# Patient Record
Sex: MALE | Race: WHITE | NOT HISPANIC OR LATINO | Employment: FULL TIME | URBAN - METROPOLITAN AREA
[De-identification: names, ages, dates, MRNs, and addresses within clinical notes are randomized per-mention and may not be internally consistent; named-entity substitution may affect disease eponyms.]

---

## 2018-05-07 ENCOUNTER — OFFICE VISIT (OUTPATIENT)
Dept: SURGERY | Facility: CLINIC | Age: 33
End: 2018-05-07

## 2018-05-07 VITALS — WEIGHT: 269 LBS | BODY MASS INDEX: 36.44 KG/M2 | HEIGHT: 72 IN | OXYGEN SATURATION: 97 % | HEART RATE: 76 BPM

## 2018-05-07 DIAGNOSIS — K62.5 RECTAL BLEEDING: Primary | ICD-10-CM

## 2018-05-07 PROCEDURE — 99214 OFFICE O/P EST MOD 30 MIN: CPT | Performed by: SURGERY

## 2018-05-07 RX ORDER — ACETAMINOPHEN 325 MG/1
650 TABLET ORAL EVERY 6 HOURS PRN
COMMUNITY

## 2018-05-07 NOTE — PROGRESS NOTES
SURGERY  Sebastien Dupree   1985 05/07/18    Chief Complaint:  Rectal bleeding    HPI    Patient is a nice 32 y.o. male who is here today with his wife, with complaints of rectal bleeding.  At first I was quite concerned that this might be secondary to a recurrence of his extremely large perianal circumferential condylomas, Vonnie Avalos, treated in 2013, but happily that's not the case.  He describes this as being dark blood, with clots of purple, then changing to read, with a pink total elbow that is occurred over the last 2 weeks.  He is also noticed a tearing sensation while having a bowel movement.  He didn't have a bowel movement the following day and thus said his symptoms improved, but when he had a bowel movement again the next day, his symptoms were reinitiated with a sense of tearing.  He denies any constipation, that might put him in greater risk for a fissure, but also denies belly pain or fever that might be associated with colitis area he has had hypertension in the range of 140/80, but this doesn't seem high enough to cause any kind of rectal bleeding.  To help with that he's reduced his sodium and has lost 18 pounds.    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  He has no family history of colon cancer.  His last colonoscopy was in May 2016, and he did have a tubular adenoma in the transverse colon at that time.    Past Medical History:   Diagnosis Date   • Colon polyp 05/04/2016    tubular adenoma in transverse colon   • Genital warts due to HPV (human papillomavirus)     Lili   • H/O Demetri's disease    • Hypertension      Past Surgical History:   Procedure Laterality Date   • COLONOSCOPY N/A 5/4/2016    Procedure: COLONOSCOPY INTO CECUM/TERMINAL ILEUM WITH POLYPECTOMY;  Surgeon: Carley Pinto MD;  Location: [facility] ENDOSCOPY;   Service:    • EXCISION LESION N/A 09/19/2013    1.6 cm right groin lesion, 1.2 cm right groin lesion, 8 mm left perianal exophytic skin lesion, 12:30 to 2:30 left perianal 8 x 3 cm exophytic skin lesion, right thigh abscess. Dr. Carley Pinto   • LAPAROSCOPIC CHOLECYSTECTOMY N/A 07/19/2010    Dr. Fabián Carty   • PENILE LESION EXCISION/BIOPSY N/A 02/20/2014    Excision of 3 penile lesions, 6, 5, and 4 mm, excision of 2 infrascrotal lesions, right 4 mm eft 6 mm, excision of 2.2 cm pubic lesion, excision of 8 mm abdominal lesions, excision of right anterior perianal mass 9 x 4 cm, closure with advancement flaps-Dr. Carley Pinto   • PERIANAL LESION/CYST EXCISION N/A 09/11/2014    multiple penile/perineal/perianal lesions excised, Dr. Carley Pinto   • RECTAL MASS EXCISION N/A 3/17/2016    Procedure:  EXCISION OF PERIANAL MASSES, RIGHT GROIN; PATH - CONDYLOMA; Surgeon: Carley Pinto MD;  Location: [facility] OR [facility] [place];  Service:    • WOUND DEBRIDEMENT N/A 06/29/2013    Excisional debridement of Fougnie gangrene, left perianal-Dr. Carley Pinto     Family History   Problem Relation Age of Onset   • Cancer Father    • Breast cancer Mother      Social History     Social History   • Marital status:      Spouse name: Krista   • Number of children: N/A   • Years of education: N/A     Occupational History   • Manager      [employer]     Social History Main Topics   • Smoking status: Current Every Day Smoker     Packs/day: 1.00     Years: 7.00   • Smokeless tobacco: Never Used   • Alcohol use Yes      Comment: 2 drinks per week   • Drug use: No   • Sexual activity: Defer     Other Topics Concern   • Not on file     Social History Narrative   • No narrative on file     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   Gastrointestinal: Positive for anal bleeding and rectal pain.   Neurological: Positive for headache.   All other systems reviewed and are negative.      Vitals:    05/07/18 1324   Pulse: 76   SpO2: 97%   Weight: 122 kg (269 lb)   Height: 182.9 cm (72\")       PHYSICAL EXAM:    Pulse 76   Ht 182.9 cm (72\")   Wt 122 kg (269 lb)   SpO2 97%   BMI 36.48 kg/m²   Body mass index is 36.48 kg/m².    Constitutional: well developed, well nourished, Appears stated age or younger  Eyes: sclera nonicteric, conjunctiva not injected or edematous  ENMT: Hearing intact, trachea midline, thyroid without masses  CVS: RRR, no murmur, peripheral edema not present  Respiratory: CTA, normal respiratory effort   Gastrointestinal: no hepatosplenomegaly, abdomen soft, nontender, anus with scarring circumferentially, but happily with no evidence of recurrence except perhaps a very small papule/skin tag in the posterior region that may be a recurrent condyloma.  It's amazing that he could have such a large area of involvement, that was successfully managed.  He has a little bit of protuberance around the external anal verge, that might be construed to be symbolic of some increased pressure that might be internal hemorrhoids, but nothing that clearly consistent with a fissure.  I did not carry out an anal exam  Genitourinary: inguinal hernia not present  Musculoskeletal: gait normal, muscle mass normal  Skin: warm and dry, no rashes visible  Neurological: awake and alert, seems to have reasonable capacity for understanding for medical decision making  Psychiatric: appears to have reasonable judgement, pleasant  Lymphatics: no cervical adenopathy     Radiographic Findings: None    Lab Findings: None    Pamphlet reviewed: None    IMPRESSION:  · Rectal bleeding, described as dark clotted blood, which we've discussed his routinely more consistent with " right sided bleeding in the colon area did he denies any symptoms whatsoever on the right side, or anything to put him at significant increased risk of having anything occur on the right side.  His tearing sensation makes it more consistent with something like an anal issue and he has nothing to suggest that he would have a colitis.  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  After noting that if he has an internal hemorrhoid at that time that we will band that.  · We will either excise or fulgurate the posterior skin tag in the context of his prior history.  · Discussed the need for him to have colonoscopies more frequently than the routine individual due to his prior histories of condylomas and increased risk of squamous cell cancer of the anus, which I think we probably should be maybe every 3 years.    Carley Pinto MD  05/07/18  5:40 PM

## 2018-05-14 ENCOUNTER — HOSPITAL ENCOUNTER (OUTPATIENT)
Facility: HOSPITAL | Age: 33
Setting detail: HOSPITAL OUTPATIENT SURGERY
Discharge: HOME | End: 2018-05-14
Attending: SURGERY | Admitting: SURGERY

## 2018-05-14 ENCOUNTER — ANESTHESIA (OUTPATIENT)
Dept: GASTROENTEROLOGY | Facility: HOSPITAL | Age: 33
End: 2018-05-14

## 2018-05-14 ENCOUNTER — ANESTHESIA EVENT (OUTPATIENT)
Dept: GASTROENTEROLOGY | Facility: HOSPITAL | Age: 33
End: 2018-05-14

## 2018-05-14 VITALS
HEART RATE: 65 BPM | HEIGHT: 72 IN | TEMPERATURE: 97.6 F | WEIGHT: 261.13 LBS | BODY MASS INDEX: 35.37 KG/M2 | RESPIRATION RATE: 14 BRPM | OXYGEN SATURATION: 99 % | SYSTOLIC BLOOD PRESSURE: 137 MMHG | DIASTOLIC BLOOD PRESSURE: 82 MMHG

## 2018-05-14 DIAGNOSIS — K62.5 RECTAL BLEEDING: ICD-10-CM

## 2018-05-14 PROCEDURE — 25010000002 GLUCAGON (RDNA) PER 1 MG: Performed by: SURGERY

## 2018-05-14 PROCEDURE — 45380 COLONOSCOPY AND BIOPSY: CPT | Performed by: SURGERY

## 2018-05-14 PROCEDURE — 88305 TISSUE EXAM BY PATHOLOGIST: CPT | Performed by: SURGERY

## 2018-05-14 PROCEDURE — 25010000002 PROPOFOL 10 MG/ML EMULSION: Performed by: ANESTHESIOLOGY

## 2018-05-14 RX ORDER — SODIUM CHLORIDE, SODIUM LACTATE, POTASSIUM CHLORIDE, CALCIUM CHLORIDE 600; 310; 30; 20 MG/100ML; MG/100ML; MG/100ML; MG/100ML
1000 INJECTION, SOLUTION INTRAVENOUS CONTINUOUS
Status: DISCONTINUED | OUTPATIENT
Start: 2018-05-14 | End: 2018-05-14 | Stop reason: HOSPADM

## 2018-05-14 RX ORDER — PROPOFOL 10 MG/ML
VIAL (ML) INTRAVENOUS CONTINUOUS PRN
Status: DISCONTINUED | OUTPATIENT
Start: 2018-05-14 | End: 2018-05-14 | Stop reason: SURG

## 2018-05-14 RX ORDER — PROPOFOL 10 MG/ML
VIAL (ML) INTRAVENOUS AS NEEDED
Status: DISCONTINUED | OUTPATIENT
Start: 2018-05-14 | End: 2018-05-14 | Stop reason: SURG

## 2018-05-14 RX ORDER — LIDOCAINE HYDROCHLORIDE 20 MG/ML
INJECTION, SOLUTION INFILTRATION; PERINEURAL AS NEEDED
Status: DISCONTINUED | OUTPATIENT
Start: 2018-05-14 | End: 2018-05-14 | Stop reason: SURG

## 2018-05-14 RX ADMIN — SODIUM CHLORIDE, POTASSIUM CHLORIDE, SODIUM LACTATE AND CALCIUM CHLORIDE 1000 ML: 600; 310; 30; 20 INJECTION, SOLUTION INTRAVENOUS at 07:41

## 2018-05-14 RX ADMIN — PROPOFOL 140 MCG/KG/MIN: 10 INJECTION, EMULSION INTRAVENOUS at 08:44

## 2018-05-14 RX ADMIN — PROPOFOL 150 MG: 10 INJECTION, EMULSION INTRAVENOUS at 08:44

## 2018-05-14 RX ADMIN — LIDOCAINE HYDROCHLORIDE 50 MG: 20 INJECTION, SOLUTION INFILTRATION; PERINEURAL at 08:44

## 2018-05-14 NOTE — ANESTHESIA PREPROCEDURE EVALUATION
Anesthesia Evaluation     Patient summary reviewed                Airway   Mallampati: III  TM distance: >3 FB  Neck ROM: full  No difficulty expected  Dental - normal exam     Pulmonary     breath sounds clear to auscultation  Cardiovascular   Exercise tolerance: good (4-7 METS)    Rhythm: regular  Rate: normal        Neuro/Psych  GI/Hepatic/Renal/Endo      Musculoskeletal     Abdominal    Substance History      OB/GYN          Other                        Anesthesia Plan    ASA 2     MAC     intravenous induction   Anesthetic plan and risks discussed with patient.

## 2018-05-14 NOTE — ANESTHESIA POSTPROCEDURE EVALUATION
Patient: Sebastien Dupree    Procedure Summary     Date:  05/14/18 Room / Location:   [facility]    Anesthesia Start:  0841 Anesthesia Stop:  0904    Procedure:  COLONOSCOPY TO CECUM AND TI with possible internal hemorrhoidal biopsy (N/A ) Diagnosis:       Rectal bleeding      (Rectal bleeding [K62.5])    Surgeon:  Carley Pinto MD Provider:  Reji Muñoz MD    Anesthesia Type:  MAC ASA Status:  2          Anesthesia Type: MAC  Last vitals  BP   137/82 (05/14/18 0934)   Temp   36.4 °C (97.6 °F) (05/14/18 0733)   Pulse   65 (05/14/18 0934)   Resp   14 (05/14/18 0934)     SpO2   99 % (05/14/18 0934)     Post Anesthesia Care and Evaluation    Patient location during evaluation: bedside  Patient participation: complete - patient participated  Level of consciousness: awake and alert  Pain score: 0  Pain management: adequate  Airway patency: patent  Anesthetic complications: No anesthetic complications  PONV Status: none  Cardiovascular status: acceptable  Respiratory status: acceptable  Hydration status: acceptable  Post Neuraxial Block status: Motor and sensory function returned to baseline

## 2018-05-14 NOTE — H&P (VIEW-ONLY)
SURGERY  Sebastien Dupree   1985 05/07/18    Chief Complaint:  Rectal bleeding    HPI    Patient is a nice 32 y.o. male who is here today with his wife, with complaints of rectal bleeding.  At first I was quite concerned that this might be secondary to a recurrence of his extremely large perianal circumferential condylomas, Vonnie Avalos, treated in 2013, but happily that's not the case.  He describes this as being dark blood, with clots of purple, then changing to read, with a pink total elbow that is occurred over the last 2 weeks.  He is also noticed a tearing sensation while having a bowel movement.  He didn't have a bowel movement the following day and thus said his symptoms improved, but when he had a bowel movement again the next day, his symptoms were reinitiated with a sense of tearing.  He denies any constipation, that might put him in greater risk for a fissure, but also denies belly pain or fever that might be associated with colitis area he has had hypertension in the range of 140/80, but this doesn't seem high enough to cause any kind of rectal bleeding.  To help with that he's reduced his sodium and has lost 18 pounds.    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  He has no family history of colon cancer.  His last colonoscopy was in May 2016, and he did have a tubular adenoma in the transverse colon at that time.    Past Medical History:   Diagnosis Date   • Colon polyp 05/04/2016    tubular adenoma in transverse colon   • Genital warts due to HPV (human papillomavirus)     Lili   • H/O Demetri's disease    • Hypertension      Past Surgical History:   Procedure Laterality Date   • COLONOSCOPY N/A 5/4/2016    Procedure: COLONOSCOPY INTO CECUM/TERMINAL ILEUM WITH POLYPECTOMY;  Surgeon: Carley Pinto MD;  Location: [facility] ENDOSCOPY;   Service:    • EXCISION LESION N/A 09/19/2013    1.6 cm right groin lesion, 1.2 cm right groin lesion, 8 mm left perianal exophytic skin lesion, 12:30 to 2:30 left perianal 8 x 3 cm exophytic skin lesion, right thigh abscess. Dr. Carley Pinto   • LAPAROSCOPIC CHOLECYSTECTOMY N/A 07/19/2010    Dr. Fabián Carty   • PENILE LESION EXCISION/BIOPSY N/A 02/20/2014    Excision of 3 penile lesions, 6, 5, and 4 mm, excision of 2 infrascrotal lesions, right 4 mm eft 6 mm, excision of 2.2 cm pubic lesion, excision of 8 mm abdominal lesions, excision of right anterior perianal mass 9 x 4 cm, closure with advancement flaps-Dr. Carley Pinto   • PERIANAL LESION/CYST EXCISION N/A 09/11/2014    multiple penile/perineal/perianal lesions excised, Dr. Carley Pinto   • RECTAL MASS EXCISION N/A 3/17/2016    Procedure:  EXCISION OF PERIANAL MASSES, RIGHT GROIN; PATH - CONDYLOMA; Surgeon: Carley Pinto MD;  [place] [facility] [place] [facility] [place]  Service:    • WOUND DEBRIDEMENT N/A 06/29/2013    Excisional debridement of Fougnie gangrene, left perianal-Dr. Carley Pinto     Family History   Problem Relation Age of Onset   • Cancer Father    • Breast cancer Mother      Social History     Social History   • Marital status:      Spouse name: Krista   • Number of children: N/A   • Years of education: N/A     Occupational History   • Manager      [employer]     Social History Main Topics   • Smoking status: Current Every Day Smoker     Packs/day: 1.00     Years: 7.00   • Smokeless tobacco: Never Used   • Alcohol use Yes      Comment: 2 drinks per week   • Drug use: No   • Sexual activity: Defer     Other Topics Concern   • Not on file     Social History Narrative   • No narrative on file     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   Gastrointestinal: Positive for anal bleeding and rectal pain.   Neurological: Positive for headache.   All other systems reviewed and are negative.      Vitals:    05/07/18 1324   Pulse: 76   SpO2: 97%   Weight: 122 kg (269 lb)   Height: 182.9 cm (72\")       PHYSICAL EXAM:    Pulse 76   Ht 182.9 cm (72\")   Wt 122 kg (269 lb)   SpO2 97%   BMI 36.48 kg/m²   Body mass index is 36.48 kg/m².    Constitutional: well developed, well nourished, Appears stated age or younger  Eyes: sclera nonicteric, conjunctiva not injected or edematous  ENMT: Hearing intact, trachea midline, thyroid without masses  CVS: RRR, no murmur, peripheral edema not present  Respiratory: CTA, normal respiratory effort   Gastrointestinal: no hepatosplenomegaly, abdomen soft, nontender, anus with scarring circumferentially, but happily with no evidence of recurrence except perhaps a very small papule/skin tag in the posterior region that may be a recurrent condyloma.  It's amazing that he could have such a large area of involvement, that was successfully managed.  He has a little bit of protuberance around the external anal verge, that might be construed to be symbolic of some increased pressure that might be internal hemorrhoids, but nothing that clearly consistent with a fissure.  I did not carry out an anal exam  Genitourinary: inguinal hernia not present  Musculoskeletal: gait normal, muscle mass normal  Skin: warm and dry, no rashes visible  Neurological: awake and alert, seems to have reasonable capacity for understanding for medical decision making  Psychiatric: appears to have reasonable judgement, pleasant  Lymphatics: no cervical adenopathy     Radiographic Findings: None    Lab Findings: None    Pamphlet reviewed: None    IMPRESSION:  · Rectal bleeding, described as dark clotted blood, which we've discussed his routinely more consistent with " right sided bleeding in the colon area did he denies any symptoms whatsoever on the right side, or anything to put him at significant increased risk of having anything occur on the right side.  His tearing sensation makes it more consistent with something like an anal issue and he has nothing to suggest that he would have a colitis.  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  After noting that if he has an internal hemorrhoid at that time that we will band that.  · We will either excise or fulgurate the posterior skin tag in the context of his prior history.  · Discussed the need for him to have colonoscopies more frequently than the routine individual due to his prior histories of condylomas and increased risk of squamous cell cancer of the anus, which I think we probably should be maybe every 3 years.    Carley Pinto MD  05/07/18  5:40 PM

## 2018-05-14 NOTE — OP NOTE
Colonoscopy Procedure Note  Sebastien Dupree  1985  Date of Procedure: 05/14/18    Pre-operative Diagnosis:  · Rectal bleeding  · Buschke Robbie, perianal condylomas, 2013, with small recurrent skin tag posteriorly      Post-operative Diagnosis:  · Posterior anal fissure  · Perianal skin tag  · Ascending colon polyp    Procedure: Colonoscopy with terminal ileoscopy, snare cautery polypectomy, excision of perianal skin tag     Findings/Treatments:   · Anal fissure, posterior with anal papilla, which were biopsied  · Ascending colon polyp, 6 mm, removed via snare cautery polypectomy  · Perianal skin tag, which was excised       Recommendations:   · C scope in 3 years based on his prior history of circumferential perianal condyloma, Vonnie Avalos.  · Call for pathology on anal papilla, which I anticipate will be benign, and the perianal skin tag, which may be condylomatous.  If condylomatous, probably exam in the office in 3 months  · Copy of photographs to be given from today's procedure prior to discharge.    Surgeon: Bliar    Anesthetic: MAC per Reji Muñoz MD    Indications:  As above    Scope Withdrawal Time:  9 minutes  50 seconds    Procedure Details     MAC anesthesia was induced.  The 180 Colonoscopy was inserted blindly into the rectum and advanced to the cecum, with relative ease,  without need for pressure, lift, or turning.  Cecum was identified by ileocecal valve and appendiceal orifice and photographed for documentation.  Terminal ileum was intubated and evaluated for last 15-20 cm and was normal.  I did this in particular due to the patient's history of purple blood from the rectum.    Prep quality was excellent.  A careful inspection was made as the colonoscope was withdrawn, including a retroflexed view of the rectum; there was no suggestion of presence of angiodysplasias, particularly looking in the cecal region, no colitis, but there was the single polyps which was removed  via snare cautery, with no diverticula.  Settings on the cautery were on 1 and 11.     At the beginning of the case, I had retroflexed in the rectum to see if there were large hemorrhoids could be banded, and small none.  At the end, I retroflexed again, and visualizing the anal papilla, decided to biopsy those with the cold biopsy forceps.    We pulled the scope out and spread the perianal tissue with a white, and noted the posterior anal fissure.  In addition the area of the perianal tag was examined, cleaned with alcohol, anesthetized with 1% lidocaine with epinephrine, and the tag excised with the knife.    Carley Pinto MD  05/14/18   9:07 AM

## 2018-05-15 LAB
CYTO UR: NORMAL
LAB AP CASE REPORT: NORMAL
Lab: NORMAL
PATH REPORT.FINAL DX SPEC: NORMAL
PATH REPORT.GROSS SPEC: NORMAL

## 2018-05-15 NOTE — PROGRESS NOTES
Rabia (endoscopy liaison),    Please call patient to inform them of these findings and recommendations and ensure that any pamphlets that were to be given to the patient at the hospital were received.     Please make sure a letter is sent to the patient, recall method entered into the computer and the HIM tab updated as far as need for endoscopy follow up.    Thanks  Dr Pinto    Date of Procedure: 05/14/18     Pre-operative Diagnosis:  · Rectal bleeding  · Renettachgentry Avalos, perianal condylomas, 2013, with small recurrent skin tag posteriorly       Post-operative Diagnosis:  · Posterior anal fissure  · Perianal skin tag  · Ascending colon polyp     Procedure: Colonoscopy with terminal ileoscopy, snare cautery polypectomy, excision of perianal skin tag      Findings/Treatments:   · Anal fissure, posterior with anal papilla, which were biopsied;  PATHOLOGY NOT CONDYLOMATOUS  · Ascending colon polyp, 6 mm, removed via snare cautery polypectomy;  ADENOMATOUS POLYP, WITH PLAN FOR REPEAT C SCOPE IN 3 YEARS  · Perianal skin tag, which was excised; CONDYLOMATOUS.  OFFICE VISIT IN 3 MONTHS.    Recommendations:   · C scope in 3 years based on his prior history of circumferential perianal condyloma, Melissagentry Robbie.  · Call for pathology on anal papilla, which I anticipate will be benign, and the perianal skin tag, which may be condylomatous.  If condylomatous, probably exam in the office in 3 months  · Copy of photographs to be given from today's procedure prior to discharge.

## 2018-05-17 ENCOUNTER — TELEPHONE (OUTPATIENT)
Dept: SURGERY | Facility: CLINIC | Age: 33
End: 2018-05-17

## 2018-05-17 NOTE — TELEPHONE ENCOUNTER
----- Message from Carley Pinto MD sent at 5/15/2018  2:11 PM EDT -----  Rabia (endoscopy liaison),    Please call patient to inform them of these findings and recommendations and ensure that any pamphlets that were to be given to the patient at the hospital were received.     Please make sure a letter is sent to the patient, recall method entered into the computer and the HIM tab updated as far as need for endoscopy follow up.    Thanks  Dr Pinto    Date of Procedure: 05/14/18     Pre-operative Diagnosis:  · Rectal bleeding  · Vonnie Avalos, perianal condylomas, 2013, with small recurrent skin tag posteriorly       Post-operative Diagnosis:  · Posterior anal fissure  · Perianal skin tag  · Ascending colon polyp     Procedure: Colonoscopy with terminal ileoscopy, snare cautery polypectomy, excision of perianal skin tag      Findings/Treatments:   · Anal fissure, posterior with anal papilla, which were biopsied;  PATHOLOGY NOT CONDYLOMATOUS  · Ascending colon polyp, 6 mm, removed via snare cautery polypectomy;  ADENOMATOUS POLYP, WITH PLAN FOR REPEAT C SCOPE IN 3 YEARS  · Perianal skin tag, which was excised; CONDYLOMATOUS.  OFFICE VISIT IN 3 MONTHS.                                        Recommendations:   · C scope in 3 years based on his prior history of circumferential perianal condyloma, Vonnie Avalos.  · Call for pathology on anal papilla, which I anticipate will be benign, and the perianal skin tag, which may be condylomatous.  If condylomatous, probably exam in the office in 3 months  · Copy of photographs to be given from today's procedure prior to discharge.

## 2021-07-27 ENCOUNTER — OFFICE VISIT (OUTPATIENT)
Dept: SURGERY | Facility: CLINIC | Age: 36
End: 2021-07-27

## 2021-07-27 VITALS — WEIGHT: 297.8 LBS | BODY MASS INDEX: 40.34 KG/M2 | HEIGHT: 72 IN

## 2021-07-27 DIAGNOSIS — R11.0 NAUSEA: ICD-10-CM

## 2021-07-27 DIAGNOSIS — K62.5 RECTAL BLEEDING: Primary | ICD-10-CM

## 2021-07-27 DIAGNOSIS — K21.9 GASTROESOPHAGEAL REFLUX DISEASE, UNSPECIFIED WHETHER ESOPHAGITIS PRESENT: ICD-10-CM

## 2021-07-27 DIAGNOSIS — A63.0: ICD-10-CM

## 2021-07-27 DIAGNOSIS — R10.11 RUQ PAIN: ICD-10-CM

## 2021-07-27 DIAGNOSIS — R19.5 MUCUS IN STOOL: ICD-10-CM

## 2021-07-27 DIAGNOSIS — R10.30 LOWER ABDOMINAL PAIN: ICD-10-CM

## 2021-07-27 PROCEDURE — 99204 OFFICE O/P NEW MOD 45 MIN: CPT | Performed by: PHYSICIAN ASSISTANT

## 2021-07-27 RX ORDER — HYDROCORTISONE ACETATE, PRAMOXINE HCL 2.5; 1 G/100G; G/100G
1 CREAM TOPICAL 3 TIMES DAILY PRN
COMMUNITY
Start: 2021-07-19

## 2021-07-27 RX ORDER — LOSARTAN POTASSIUM 25 MG/1
25 TABLET ORAL DAILY
COMMUNITY

## 2021-07-27 RX ORDER — ONDANSETRON 4 MG/1
1 TABLET, ORALLY DISINTEGRATING ORAL EVERY 6 HOURS PRN
COMMUNITY
Start: 2021-07-19

## 2021-07-27 RX ORDER — ESCITALOPRAM OXALATE 10 MG/1
1 TABLET ORAL DAILY
COMMUNITY
Start: 2021-07-14

## 2021-07-27 RX ORDER — PANTOPRAZOLE SODIUM 40 MG/1
1 TABLET, DELAYED RELEASE ORAL DAILY
COMMUNITY
Start: 2021-07-19

## 2021-07-27 NOTE — PROGRESS NOTES
Chief Complaint   Patient presents with   • Rectal Bleeding   • Abdominal Pain        Subjective   Sebastien Dupree is a 36 y.o. gentleman who was self-referred for consultation.  Last saw Dr. Pinto greater than 3 years ago when he underwent a colonoscopy in May 2018.  At that time he was experiencing rectal bleeding as well as having a personal history of colon polyps.  He was recommended to undergo a follow-up colonoscopy in 3 years time.  He presents today having 1 week of multiple episodes of rectal bleeding as well as an urgency with his bowel movements.  He does state that he occasionally has constipation but then also has bowel movements which appear to have the mucousy appearance.  Additionally he has lower abdominal pain which radiates from his right lower quadrant up to into his right upper quadrant.  He has also had issues with nausea as well as worsening reflux of recent.  As a result of his symptoms he did go to [facility] on 7/19/2021 at which time a CT scan of the abdomen and pelvis was performed which demonstrated no abnormalities.  At that time he was also placed on Zofran and Protonix and he states he has noticed an improvement in his right upper quadrant abdominal pain as well as nausea and reflux.    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  He states things are controlled now.  He previously had his gallbladder removed in 2010 by Dr. Macario Carty.    Past Medical History:   Diagnosis Date   • Acid reflux    • Anxiety    • Back problem    • Colon polyp 05/04/2016    tubular adenoma in transverse colon   • Genital warts due to HPV (human papillomavirus)     Lili   • H/O Demetri's disease    • Hypertension    • Rectal bleeding      Past Surgical History:   Procedure Laterality Date   •  COLONOSCOPY N/A 5/4/2016    Procedure: COLONOSCOPY INTO CECUM/TERMINAL ILEUM WITH POLYPECTOMY;  Surgeon: Carley Pinto MD;  Location: [facility] ENDOSCOPY;  Service:    • COLONOSCOPY N/A 5/14/2018    Procedure: COLONOSCOPY TO CECUM AND TI with possible internal hemorrhoidal biopsy;  Surgeon: Carley Pinto MD;  Location: [facility] ENDOSCOPY;  Service: Gastroenterology   • EXCISION LESION N/A 09/19/2013    1.6 cm right groin lesion, 1.2 cm right groin lesion, 8 mm left perianal exophytic skin lesion, 12:30 to 2:30 left perianal 8 x 3 cm exophytic skin lesion, right thigh abscess. Dr. Carley Pinto   • LAPAROSCOPIC CHOLECYSTECTOMY N/A 07/19/2010    Dr. Fabián Carty   • PENILE LESION EXCISION/BIOPSY N/A 02/20/2014    Excision of 3 penile lesions, 6, 5, and 4 mm, excision of 2 infrascrotal lesions, right 4 mm eft 6 mm, excision of 2.2 cm pubic lesion, excision of 8 mm abdominal lesions, excision of right anterior perianal mass 9 x 4 cm, closure with advancement flaps-Dr. Carley Pinto   • PERIANAL LESION/CYST EXCISION N/A 09/11/2014    multiple penile/perineal/perianal lesions excised, Dr. Carley Pinto   • RECTAL MASS EXCISION N/A 3/17/2016    Procedure:  EXCISION OF PERIANAL MASSES, RIGHT GROIN; PATH - CONDYLOMA; Surgeon: Carley Pinto MD;  [place] [facility] [place] [facility]  Service:    • WOUND DEBRIDEMENT N/A 06/29/2013    Excisional debridement of Fougnie gangrene, left perianal-Dr. Carley Pinto       Current Outpatient Medications:   •  escitalopram (LEXAPRO) 10 MG tablet, Take 1 tablet by mouth Daily., Disp: , Rfl:   •  losartan (COZAAR) 25 MG tablet, Take 25 mg by mouth Daily., Disp: , Rfl:   •  ondansetron ODT (ZOFRAN-ODT) 4 MG disintegrating tablet, Place 1 tablet under the tongue Every 6 (Six) Hours As Needed., Disp: , Rfl:   •  pantoprazole (PROTONIX) 40 MG EC tablet, Take 1 tablet by mouth Daily., Disp: , Rfl:   •  Pramoxine-HC (Hydrocortisone Ace-Pramoxine) 2.5-1 % cream, Apply 1 application topically to the  appropriate area as directed 3 (Three) Times a Day As Needed., Disp: , Rfl:   •  acetaminophen (TYLENOL) 325 MG tablet, Take 650 mg by mouth Every 6 (Six) Hours As Needed for Mild Pain ., Disp: , Rfl:   No Known Allergies  Family History   Problem Relation Age of Onset   • Prostate cancer Father    • Breast cancer Mother    • Malig Hyperthermia Neg Hx      Social History     Socioeconomic History   • Marital status:      Spouse name: Krista   • Number of children: Not on file   • Years of education: Not on file   • Highest education level: Not on file   Tobacco Use   • Smoking status: Former Smoker     Packs/day: 2.00     Years: 10.00     Pack years: 20.00     Quit date: 2019     Years since quittin.5   • Smokeless tobacco: Never Used   Vaping Use   • Vaping Use: Never used   Substance and Sexual Activity   • Alcohol use: Yes     Alcohol/week: 1.0 standard drinks     Types: 1 Standard drinks or equivalent per week     Comment: monthly   • Drug use: No   • Sexual activity: Defer     Colonoscopy: Colonoscopy  with a history of colon polyps, 3-year follow-up was recommended    Review of Systems   Constitutional: Positive for diaphoresis and fatigue. Negative for activity change, appetite change, chills, fever and unexpected weight change.   HENT: Negative.    Eyes: Negative.    Respiratory: Negative.  Negative for apnea, cough, choking, chest tightness, shortness of breath, wheezing and stridor.    Cardiovascular: Negative.  Negative for chest pain, palpitations and leg swelling.   Gastrointestinal: Positive for abdominal distention, abdominal pain, anal bleeding, diarrhea, nausea and rectal pain.   Endocrine: Negative.    Genitourinary: Negative.    Musculoskeletal: Negative.    Skin: Negative.    Allergic/Immunologic: Negative.    Neurological: Negative.    Hematological: Negative.    Psychiatric/Behavioral: The patient is nervous/anxious.    All other systems reviewed and are negative.          Objective   There were no vitals filed for this visit.  Physical Exam  Vitals reviewed.   Constitutional:       General: He is not in acute distress.     Appearance: Normal appearance. He is obese.   HENT:      Head: Normocephalic and atraumatic.   Eyes:      General: No scleral icterus.     Conjunctiva/sclera: Conjunctivae normal.      Pupils: Pupils are equal, round, and reactive to light.   Cardiovascular:      Rate and Rhythm: Normal rate and regular rhythm.      Heart sounds: Normal heart sounds. No murmur heard.   No friction rub. No gallop.    Pulmonary:      Effort: Pulmonary effort is normal. No respiratory distress.      Breath sounds: Normal breath sounds. No stridor. No wheezing, rhonchi or rales.   Chest:      Chest wall: No tenderness.   Abdominal:      General: Abdomen is flat. There is no distension.      Palpations: Abdomen is soft. There is no mass.      Tenderness: There is no abdominal tenderness. There is no guarding or rebound.      Hernia: No hernia is present.   Musculoskeletal:         General: No tenderness. Normal range of motion.      Cervical back: Normal range of motion. No tenderness.   Skin:     General: Skin is warm and dry.      Findings: No rash.   Neurological:      Mental Status: He is alert and oriented to person, place, and time.   Psychiatric:         Mood and Affect: Mood normal.         Behavior: Behavior normal.         Thought Content: Thought content normal.         Judgment: Judgment normal.              Assessment/Plan    Diagnosis Plan   1. Rectal bleeding  Case Request    Case Request   2. Mucus in stool  Case Request    Case Request   3. Buschke-Robbie giant condyloma  Case Request    Case Request   4. RUQ pain  Case Request    Case Request   5. Lower abdominal pain  Case Request    Case Request   6. Gastroesophageal reflux disease, unspecified whether esophagitis present  Case Request    Case Request   7. Nausea  Case Request    Case Request       Clinical  Summary:  36 y.o. gentleman who presents to the office today with 1 week of multiple episodes of rectal bleeding, urgency with his bowel movements, mucousy bowel movements, lower abdominal pain and right upper quadrant abdominal pain, worsening reflux and occasional nausea.    -Colonoscopy: I have recommended proceeding with a colonoscopy due to his changes in bowel habits to include the mucousy stools as well as the rectal bleeding.  Additionally he is due for colonoscopy due to his history of colon polyps.  I discussed the risks and rationale with him with recently but not limited to bleeding, infection, bowel perforation and the need for additional procedures.  Any additional follow-up will be discussed once the results been reviewed.    -EGD: I am recommending proceeding with an EGD due to his worsening GERD, nausea and right upper quadrant abdominal pain.  The symptoms were improved with the introduction of Protonix as well as taking Zofran.  I did discuss the risks and rationale with him as discussed above.    -Vonnie Avalos giant condyloma: He states this is controlled and uses pramoxine HC.    Any additional follow-up will be discussed with him once the results have been reviewed.  All questions were answered and he was willing to proceed with all recommendations.      Isaias Gonzales PA-C

## 2021-08-03 PROBLEM — R11.0 NAUSEA: Status: ACTIVE | Noted: 2021-08-03

## 2021-08-03 PROBLEM — R10.30 LOWER ABDOMINAL PAIN: Status: ACTIVE | Noted: 2021-08-03

## 2021-08-03 PROBLEM — A63.0: Status: ACTIVE | Noted: 2021-08-03

## 2021-08-03 PROBLEM — R19.5 MUCUS IN STOOL: Status: ACTIVE | Noted: 2021-08-03

## 2021-08-03 PROBLEM — R10.11 RUQ PAIN: Status: ACTIVE | Noted: 2021-08-03

## 2021-08-03 PROBLEM — K21.9 GASTROESOPHAGEAL REFLUX DISEASE: Status: ACTIVE | Noted: 2021-08-03

## 2021-08-17 ENCOUNTER — TRANSCRIBE ORDERS (OUTPATIENT)
Dept: SLEEP MEDICINE | Facility: HOSPITAL | Age: 36
End: 2021-08-17

## 2021-08-17 ENCOUNTER — TELEPHONE (OUTPATIENT)
Dept: SURGERY | Facility: CLINIC | Age: 36
End: 2021-08-17

## 2021-08-17 DIAGNOSIS — Z01.818 OTHER SPECIFIED PRE-OPERATIVE EXAMINATION: Primary | ICD-10-CM

## 2021-08-20 ENCOUNTER — LAB (OUTPATIENT)
Dept: LAB | Facility: HOSPITAL | Age: 36
End: 2021-08-20

## 2021-08-20 DIAGNOSIS — Z01.818 OTHER SPECIFIED PRE-OPERATIVE EXAMINATION: ICD-10-CM

## 2021-08-20 PROCEDURE — U0004 COV-19 TEST NON-CDC HGH THRU: HCPCS

## 2021-08-20 PROCEDURE — C9803 HOPD COVID-19 SPEC COLLECT: HCPCS

## 2021-08-21 LAB — SARS-COV-2 ORF1AB RESP QL NAA+PROBE: NOT DETECTED

## 2021-08-23 ENCOUNTER — ANESTHESIA (OUTPATIENT)
Dept: GASTROENTEROLOGY | Facility: HOSPITAL | Age: 36
End: 2021-08-23

## 2021-08-23 ENCOUNTER — ANESTHESIA EVENT (OUTPATIENT)
Dept: GASTROENTEROLOGY | Facility: HOSPITAL | Age: 36
End: 2021-08-23

## 2021-08-23 ENCOUNTER — HOSPITAL ENCOUNTER (OUTPATIENT)
Facility: HOSPITAL | Age: 36
Setting detail: HOSPITAL OUTPATIENT SURGERY
Discharge: HOME | End: 2021-08-23
Attending: SURGERY | Admitting: SURGERY

## 2021-08-23 VITALS
BODY MASS INDEX: 40.35 KG/M2 | RESPIRATION RATE: 18 BRPM | SYSTOLIC BLOOD PRESSURE: 123 MMHG | OXYGEN SATURATION: 96 % | HEART RATE: 58 BPM | HEIGHT: 72 IN | DIASTOLIC BLOOD PRESSURE: 60 MMHG | WEIGHT: 297.9 LBS | TEMPERATURE: 97.4 F

## 2021-08-23 DIAGNOSIS — R11.0 NAUSEA: ICD-10-CM

## 2021-08-23 DIAGNOSIS — K62.5 RECTAL BLEEDING: ICD-10-CM

## 2021-08-23 DIAGNOSIS — A63.0: ICD-10-CM

## 2021-08-23 DIAGNOSIS — K21.9 GASTROESOPHAGEAL REFLUX DISEASE, UNSPECIFIED WHETHER ESOPHAGITIS PRESENT: ICD-10-CM

## 2021-08-23 DIAGNOSIS — R10.11 RUQ PAIN: ICD-10-CM

## 2021-08-23 DIAGNOSIS — R19.5 MUCUS IN STOOL: ICD-10-CM

## 2021-08-23 DIAGNOSIS — R10.30 LOWER ABDOMINAL PAIN: ICD-10-CM

## 2021-08-23 PROCEDURE — 25010000002 GLUCAGON (RDNA) PER 1 MG: Performed by: SURGERY

## 2021-08-23 PROCEDURE — 45378 DIAGNOSTIC COLONOSCOPY: CPT | Performed by: SURGERY

## 2021-08-23 PROCEDURE — 25010000002 PROPOFOL 10 MG/ML EMULSION: Performed by: REGISTERED NURSE

## 2021-08-23 PROCEDURE — 88305 TISSUE EXAM BY PATHOLOGIST: CPT | Performed by: SURGERY

## 2021-08-23 PROCEDURE — 43239 EGD BIOPSY SINGLE/MULTIPLE: CPT | Performed by: SURGERY

## 2021-08-23 RX ORDER — LIDOCAINE HYDROCHLORIDE 20 MG/ML
INJECTION, SOLUTION INFILTRATION; PERINEURAL AS NEEDED
Status: DISCONTINUED | OUTPATIENT
Start: 2021-08-23 | End: 2021-08-23 | Stop reason: SURG

## 2021-08-23 RX ORDER — SODIUM CHLORIDE 0.9 % (FLUSH) 0.9 %
10 SYRINGE (ML) INJECTION AS NEEDED
Status: DISCONTINUED | OUTPATIENT
Start: 2021-08-23 | End: 2021-08-23 | Stop reason: HOSPADM

## 2021-08-23 RX ORDER — SODIUM CHLORIDE, SODIUM LACTATE, POTASSIUM CHLORIDE, CALCIUM CHLORIDE 600; 310; 30; 20 MG/100ML; MG/100ML; MG/100ML; MG/100ML
1000 INJECTION, SOLUTION INTRAVENOUS CONTINUOUS
Status: DISCONTINUED | OUTPATIENT
Start: 2021-08-23 | End: 2021-08-23 | Stop reason: HOSPADM

## 2021-08-23 RX ORDER — PROPOFOL 10 MG/ML
VIAL (ML) INTRAVENOUS AS NEEDED
Status: DISCONTINUED | OUTPATIENT
Start: 2021-08-23 | End: 2021-08-23 | Stop reason: SURG

## 2021-08-23 RX ORDER — LIDOCAINE HYDROCHLORIDE 10 MG/ML
0.5 INJECTION, SOLUTION INFILTRATION; PERINEURAL ONCE AS NEEDED
Status: DISCONTINUED | OUTPATIENT
Start: 2021-08-23 | End: 2021-08-23 | Stop reason: HOSPADM

## 2021-08-23 RX ORDER — GLYCOPYRROLATE 0.2 MG/ML
INJECTION INTRAMUSCULAR; INTRAVENOUS AS NEEDED
Status: DISCONTINUED | OUTPATIENT
Start: 2021-08-23 | End: 2021-08-23 | Stop reason: SURG

## 2021-08-23 RX ADMIN — GLYCOPYRROLATE 0.2 MG: 0.2 INJECTION INTRAMUSCULAR; INTRAVENOUS at 11:32

## 2021-08-23 RX ADMIN — LIDOCAINE HYDROCHLORIDE 60 MG: 20 INJECTION, SOLUTION INFILTRATION; PERINEURAL at 11:32

## 2021-08-23 RX ADMIN — PROPOFOL 180 MCG/KG/MIN: 10 INJECTION, EMULSION INTRAVENOUS at 11:32

## 2021-08-23 RX ADMIN — SODIUM CHLORIDE, POTASSIUM CHLORIDE, SODIUM LACTATE AND CALCIUM CHLORIDE 1000 ML: 600; 310; 30; 20 INJECTION, SOLUTION INTRAVENOUS at 10:51

## 2021-08-23 RX ADMIN — PROPOFOL 100 MG: 10 INJECTION, EMULSION INTRAVENOUS at 11:32

## 2021-08-23 NOTE — OP NOTE
Colonoscopy/EGD Procedure Note  Sebastien Dupree  1985  Date of Procedure: 08/23/21    Pre-operative Diagnosis:    · Rectal bleeding, resolved.  · Reflux, improved on protonix.  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    Surgeon: Blair    Anesthetic: MAC per Magdy Willoughby MD    Indications:  As above    Scope Withdrawal Time:  6 minutes  6 seconds    Procedure Details     MAC anesthesia was induced.  The bite block was placed at this time, while the patient was still cooperative, prior to complete relaxation, for use during the EGD. The 180 Colonoscopy was inserted blindly into the rectum and advanced to the cecum, with relative ease without need for turning or pressure.    Cecum was identified by ileocecal valve and appendiceal orifice and photographed for documentation.  Terminal ileum was intubated and was normal.      Prep quality was excellent.  A careful inspection was made as the colonoscope was withdrawn, including a retroflexed view of the rectum; there was no suggestion of presence of angiodysplasias, colitis, polyps or diverticula, with no interventions as listed.      Attention was then turned to the EGD.  Of course, new gowns, new gloves, and new instruments were used.  Gastroscope was inserted thru the bite block and advanced under direct vision to second portion of the duodenum.  A careful inspection was made as the gastroscope was withdrawn, including a retroflexed view of the proximal stomach; findings and interventions are described above.   Biopsies were  done with the cold biopsy forceps.      Carley Pinto MD  08/23/21  12:02 EDT                     .

## 2021-08-23 NOTE — ANESTHESIA POSTPROCEDURE EVALUATION
"Patient: Sebastien Dupree    Procedure Summary     Date: 08/23/21 Room / Location:  [facility]    Anesthesia Start: 1129 Anesthesia Stop: 1156    Procedures:       COLONOSCOPY TO cecum and terminal ileum (N/A )      ESOPHAGOGASTRODUODENOSCOPY with bx (N/A Esophagus) Diagnosis:       Rectal bleeding      Mucus in stool      Buschke-Robbie giant condyloma      RUQ pain      Lower abdominal pain      Gastroesophageal reflux disease, unspecified whether esophagitis present      Nausea      (Rectal bleeding [K62.5])      (Mucus in stool [R19.5])      (Buschke-Robbie giant condyloma [A63.0])      (RUQ pain [R10.11])      (Lower abdominal pain [R10.30])      (Gastroesophageal reflux disease, unspecified whether esophagitis present [K21.9])      (Nausea [R11.0])    Surgeons: Carley Pinto MD Provider: Magdy Willoughby MD    Anesthesia Type: MAC ASA Status: 3          Anesthesia Type: MAC    Vitals  Vitals Value Taken Time   /60 08/23/21 1224   Temp     Pulse 61 08/23/21 1229   Resp 18 08/23/21 1224   SpO2 96 % 08/23/21 1229   Vitals shown include unvalidated device data.        Post Anesthesia Care and Evaluation    Patient location during evaluation: bedside  Patient participation: complete - patient participated  Level of consciousness: awake and alert  Pain management: adequate  Airway patency: patent  Anesthetic complications: No anesthetic complications    Cardiovascular status: acceptable  Respiratory status: acceptable  Hydration status: acceptable    Comments: /60   Pulse 58   Temp 36.3 °C (97.4 °F) (Oral)   Resp 18   Ht 182.9 cm (72\")   Wt 135 kg (297 lb 14.4 oz)   SpO2 96%   BMI 40.40 kg/m²       " normal...

## 2021-08-23 NOTE — H&P
Chief Complaint   Patient presents with   • Rectal Bleeding   • Abdominal Pain       Here for EGD and c scope for rectal bleeding and reflux (better with protonix).  He had some burning at the anus when this happened.  He had a polyp in 2016 that was adenomatous.      Subjective      Sebastien Dupree is a 36 y.o. gentleman who was self-referred for consultation.  Last saw Dr. Pinto greater than 3 years ago when he underwent a colonoscopy in May 2018.  At that time he was experiencing rectal bleeding as well as having a personal history of colon polyps.  He was recommended to undergo a follow-up colonoscopy in 3 years time.  He presents today having 1 week of multiple episodes of rectal bleeding as well as an urgency with his bowel movements.  He does state that he occasionally has constipation but then also has bowel movements which appear to have the mucousy appearance.  Additionally he has lower abdominal pain which radiates from his right lower quadrant up to into his right upper quadrant.  He has also had issues with nausea as well as worsening reflux of recent.  As a result of his symptoms he did go to [facility] on 7/19/2021 at which time a CT scan of the abdomen and pelvis was performed which demonstrated no abnormalities.  At that time he was also placed on Zofran and Protonix and he states he has noticed an improvement in his right upper quadrant abdominal pain as well as nausea and reflux.     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  Additionally he has Buschke Robbie and has undergone multiple excisions for these.  He states things are controlled now.  He previously had his gallbladder removed in 2010 by Dr. Macario Carty.     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 ENDOSCOPY;  Service:    • COLONOSCOPY N/A 5/14/2018     Procedure: COLONOSCOPY TO CECUM AND TI with possible internal hemorrhoidal biopsy;  Surgeon: Carley Pinto MD;  Location: [facility] ENDOSCOPY;  Service: Gastroenterology   • EXCISION LESION N/A 09/19/2013     1.6 cm right groin lesion, 1.2 cm right groin lesion, 8 mm left perianal exophytic skin lesion, 12:30 to 2:30 left perianal 8 x 3 cm exophytic skin lesion, right thigh abscess. Dr. Carley Pinto   • LAPAROSCOPIC CHOLECYSTECTOMY N/A 07/19/2010     Dr. Fabián Carty   • PENILE LESION EXCISION/BIOPSY N/A 02/20/2014     Excision of 3 penile lesions, 6, 5, and 4 mm, excision of 2 infrascrotal lesions, right 4 mm eft 6 mm, excision of 2.2 cm pubic lesion, excision of 8 mm abdominal lesions, excision of right anterior perianal mass 9 x 4 cm, closure with advancement flaps-Dr. Carley Pinto   • PERIANAL LESION/CYST EXCISION N/A 09/11/2014     multiple penile/perineal/perianal lesions excised, Dr. Carley Pinto   • RECTAL MASS EXCISION N/A 3/17/2016     Procedure:  EXCISION OF PERIANAL MASSES, RIGHT GROIN; PATH - CONDYLOMA; Surgeon: Carley Pinto MD;  Location: [facility] OR [facility] [place];  Service:    • WOUND DEBRIDEMENT N/A 06/29/2013     Excisional debridement of Fougnie gangrene, left perianal-Dr. Carley Pinto            Current Outpatient Medications:   •  escitalopram (LEXAPRO) 10 MG tablet, Take 1 tablet by mouth Daily., Disp: , Rfl:   •  losartan (COZAAR) 25 MG tablet, Take 25 mg by mouth Daily., Disp: , Rfl:   •  ondansetron ODT (ZOFRAN-ODT) 4 MG disintegrating tablet, Place 1  tablet under the tongue Every 6 (Six) Hours As Needed., Disp: , Rfl:   •  pantoprazole (PROTONIX) 40 MG EC tablet, Take 1 tablet by mouth Daily., Disp: , Rfl:   •  Pramoxine-HC (Hydrocortisone Ace-Pramoxine) 2.5-1 % cream, Apply 1 application topically to the appropriate area as directed 3 (Three) Times a Day As Needed., Disp: , Rfl:   •  acetaminophen (TYLENOL) 325 MG tablet, Take 650 mg by mouth Every 6 (Six) Hours As Needed for Mild Pain ., Disp: , Rfl:   No Known Allergies        Family History   Problem Relation Age of Onset   • Prostate cancer Father     • Breast cancer Mother     • Malig Hyperthermia Neg Hx        Social History   Social History            Socioeconomic History   • Marital status:        Spouse name: Krista   • Number of children: Not on file   • Years of education: Not on file   • Highest education level: Not on file   Tobacco Use   • Smoking status: Former Smoker       Packs/day: 2.00       Years: 10.00       Pack years: 20.00       Quit date: 2019       Years since quittin.5   • Smokeless tobacco: Never Used   Vaping Use   • Vaping Use: Never used   Substance and Sexual Activity   • Alcohol use: Yes       Alcohol/week: 1.0 standard drinks       Types: 1 Standard drinks or equivalent per week       Comment: monthly   • Drug use: No   • Sexual activity: Defer         Colonoscopy: Colonoscopy 2018 with a history of colon polyps, 3-year follow-up was recommended     Review of Systems   Constitutional: Positive for diaphoresis and fatigue. Negative for activity change, appetite change, chills, fever and unexpected weight change.   HENT: Negative.    Eyes: Negative.    Respiratory: Negative.  Negative for apnea, cough, choking, chest tightness, shortness of breath, wheezing and stridor.    Cardiovascular: Negative.  Negative for chest pain, palpitations and leg swelling.   Gastrointestinal: Positive for abdominal distention, abdominal pain, anal bleeding, diarrhea, nausea and  rectal pain.   Endocrine: Negative.    Genitourinary: Negative.    Musculoskeletal: Negative.    Skin: Negative.    Allergic/Immunologic: Negative.    Neurological: Negative.    Hematological: Negative.    Psychiatric/Behavioral: The patient is nervous/anxious.    All other systems reviewed and are negative.                 Objective      There were no vitals filed for this visit.  Physical Exam  Vitals reviewed.   Constitutional:       General: He is not in acute distress.     Appearance: Normal appearance. He is obese.   HENT:      Head: Normocephalic and atraumatic.   Eyes:      General: No scleral icterus.     Conjunctiva/sclera: Conjunctivae normal.      Pupils: Pupils are equal, round, and reactive to light.   Cardiovascular:      Rate and Rhythm: Normal rate and regular rhythm.      Heart sounds: Normal heart sounds. No murmur heard.   No friction rub. No gallop.    Pulmonary:      Effort: Pulmonary effort is normal. No respiratory distress.      Breath sounds: Normal breath sounds. No stridor. No wheezing, rhonchi or rales.   Chest:      Chest wall: No tenderness.   Abdominal:      General: Abdomen is flat. There is no distension.      Palpations: Abdomen is soft. There is no mass.      Tenderness: There is no abdominal tenderness. There is no guarding or rebound.      Hernia: No hernia is present.   Musculoskeletal:         General: No tenderness. Normal range of motion.      Cervical back: Normal range of motion. No tenderness.   Skin:     General: Skin is warm and dry.      Findings: No rash.   Neurological:      Mental Status: He is alert and oriented to person, place, and time.   Psychiatric:         Mood and Affect: Mood normal.         Behavior: Behavior normal.         Thought Content: Thought content normal.         Judgment: Judgment normal.                        Assessment/Plan        Diagnosis Plan   1. Rectal bleeding  Case Request     Case Request   2. Mucus in stool  Case Request     Case  Request   3. Buschke-Robbie giant condyloma  Case Request     Case Request   4. RUQ pain  Case Request     Case Request   5. Lower abdominal pain  Case Request     Case Request   6. Gastroesophageal reflux disease, unspecified whether esophagitis present  Case Request     Case Request   7. Nausea  Case Request     Case Request         Clinical Summary:  36 y.o. gentleman who presents to the office today with 1 week of multiple episodes of rectal bleeding, urgency with his bowel movements, mucousy bowel movements, lower abdominal pain and right upper quadrant abdominal pain, worsening reflux and occasional nausea.     -Colonoscopy: I have recommended proceeding with a colonoscopy due to his changes in bowel habits to include the mucousy stools as well as the rectal bleeding.  Additionally he is due for colonoscopy due to his history of colon polyps.  I discussed the risks and rationale with him with recently but not limited to bleeding, infection, bowel perforation and the need for additional procedures.  Any additional follow-up will be discussed once the results been reviewed.     -EGD: I am recommending proceeding with an EGD due to his worsening GERD, nausea and right upper quadrant abdominal pain.  The symptoms were improved with the introduction of Protonix as well as taking Zofran.  I did discuss the risks and rationale with him as discussed above.     -Buschke Robbie giant condyloma: He states this is controlled and uses pramoxine HC.     Any additional follow-up will be discussed with him once the results have been reviewed.  All questions were answered and he was willing to proceed with all recommendations.

## 2021-08-23 NOTE — ANESTHESIA PREPROCEDURE EVALUATION
Anesthesia Evaluation     Patient summary reviewed and Nursing notes reviewed   no history of anesthetic complications:  NPO Solid Status: > 8 hours  NPO Liquid Status: > 4 hours           Airway   Mallampati: II  Dental      Pulmonary - normal exam   (+) a smoker Former,   Cardiovascular - normal exam    (+) hypertension less than 2 medications,       Neuro/Psych  (+) psychiatric history Anxiety,     GI/Hepatic/Renal/Endo    (+) obesity, morbid obesity, GERD, GI bleeding ,     Musculoskeletal     Abdominal    Substance History      OB/GYN          Other                        Anesthesia Plan    ASA 3     MAC     intravenous induction     Anesthetic plan, all risks, benefits, and alternatives have been provided, discussed and informed consent has been obtained with: patient.

## 2021-08-24 LAB
CYTO UR: NORMAL
LAB AP CASE REPORT: NORMAL
LAB AP DIAGNOSIS COMMENT: NORMAL
PATH REPORT.FINAL DX SPEC: NORMAL
PATH REPORT.GROSS SPEC: NORMAL

## 2021-08-25 NOTE — PROGRESS NOTES
Rabia (endoscopy liaison),    Please call patient tto inform them of these findings and recommendations and ensure that any pamphlets that were to be given to the patient at the hospital were received.     Please make sure a letter is sent to the patient, recall method entered into the computer and the HIM tab updated as far as need for endoscopy follow up.    Thanks  Dr Pinto    Colonoscopy/EGD Procedure Note  Doeion Leia  1985  Date of Procedure: 08/23/21     Pre-operative Diagnosis:    · Rectal bleeding, resolved.  · Reflux, improved on protonix.  · History of adenomatous polyp 2016  · History of perianal condyloma     Post-operative Diagnosis:  · Anal papilla consistent with prior fissure  · Possible Sykes's, 1 cm by 2.5 cm, single linear island;  PATH NEGATIVE FOR SYKES'S     Procedure:   · Colonoscopy   · EGD with biopsy     Recommendations:   · C scope in 10 years  · Await pathology on esophagus for directions;  EGD RECOMMENDED ONLY IF CONTINUED SYMPTOMS FOR 3-4 YEARS.  · Lose weight to assist with reflux;  YES  · Keep a copy of the photographs taken today for your procedure for possible reference in the future.

## 2021-08-26 ENCOUNTER — TELEPHONE (OUTPATIENT)
Dept: SURGERY | Facility: CLINIC | Age: 36
End: 2021-08-26

## 2021-08-26 NOTE — TELEPHONE ENCOUNTER
----- Message from Carley Pinto MD sent at 8/25/2021 10:28 AM EDT -----  Rabia (endoscopy liaison),    Please call patient tto inform them of these findings and recommendations and ensure that any pamphlets that were to be given to the patient at the hospital were received.     Please make sure a letter is sent to the patient, recall method entered into the computer and the HIM tab updated as far as need for endoscopy follow up.    Thanks  Dr Pinto    Colonoscopy/EGD Procedure Note  Sebastien Dupree  1985  Date of Procedure: 08/23/21     Pre-operative Diagnosis:    · Rectal bleeding, resolved.  · Reflux, improved on protonix.  · History of adenomatous polyp 2016  · History of perianal condyloma     Post-operative Diagnosis:  · Anal papilla consistent with prior fissure  · Possible Kumar's, 1 cm by 2.5 cm, single linear island;  PATH NEGATIVE FOR KUMAR'S     Procedure:   · Colonoscopy   · EGD with biopsy                                         Recommendations:   · C scope in 10 years  · Await pathology on esophagus for directions;  EGD RECOMMENDED ONLY IF CONTINUED SYMPTOMS FOR 3-4 YEARS.  · Lose weight to assist with reflux;  YES  · Keep a copy of the photographs taken today for your procedure for possible reference in the future.

## 2024-06-20 NOTE — PROGRESS NOTES
SURGERY  Sebastien Dupree   1985 06/24/24    Chief Complaint:  inflamed external hemorrhoid with bleeding.     HPI    Mr. Dupree is a nice 38 y.o. 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  He comes in now with rectal problems.  I saw him last in 2021 with c scope showing no abnormalities, other than an anal papilla consistent with prior fissure.  At that time, he had some temporary bleeding.          There is no family history of colon cancer.      Past Medical History:   Diagnosis Date    Acid reflux     Anxiety     Back problem     Colon polyp 05/04/2016    tubular adenoma in transverse colon    Genital warts due to HPV (human papillomavirus)     Buschke-Robbie    H/O Demetri's disease     Hypertension     Rectal bleeding      Past Surgical History:   Procedure Laterality Date    COLONOSCOPY N/A 5/4/2016    Procedure: COLONOSCOPY INTO CECUM/TERMINAL ILEUM WITH POLYPECTOMY;  Surgeon: Carley Pinto MD;  Location: [facility] ENDOSCOPY;  Service:     COLONOSCOPY N/A 5/14/2018    Procedure: COLONOSCOPY TO CECUM AND TI with possible internal hemorrhoidal biopsy;  Surgeon: Carley Pinto MD;  Location: [facility] ENDOSCOPY;  Service: Gastroenterology    COLONOSCOPY N/A 8/23/2021    Procedure: COLONOSCOPY TO cecum and terminal ileum;  Surgeon: Carley Pinto MD;  Location: [facility] ENDOSCOPY;  Service: General;  Laterality: N/A;  RECTAL BLEEDING  -- anal papilla    ENDOSCOPY N/A 8/23/2021    Procedure: ESOPHAGOGASTRODUODENOSCOPY with bx;  Surgeon: Carley Pinto MD;  Location: [facility] ENDOSCOPY;  Service: General;  Laterality: N/A;  GERD, ABD PAIN  -- possible patricia's    EXCISION LESION N/A 09/19/2013    1.6 cm right groin lesion, 1.2 cm right groin lesion, 8 mm left perianal exophytic skin lesion, 12:30 to 2:30 left perianal 8 x 3 cm exophytic skin lesion, right thigh abscess. Dr. Carley Pinto    LAPAROSCOPIC CHOLECYSTECTOMY N/A  2010    Dr. Fabián Carty    PENILE LESION EXCISION/BIOPSY N/A 2014    Excision of 3 penile lesions, 6, 5, and 4 mm, excision of 2 infrascrotal lesions, right 4 mm eft 6 mm, excision of 2.2 cm pubic lesion, excision of 8 mm abdominal lesions, excision of right anterior perianal mass 9 x 4 cm, closure with advancement flaps-Dr. Carley Pinto    PERIANAL LESION/CYST EXCISION N/A 2014    multiple penile/perineal/perianal lesions excised, Dr. Carley Pinto    RECTAL MASS EXCISION N/A 3/17/2016    Procedure:  EXCISION OF PERIANAL MASSES, RIGHT GROIN; PATH - CONDYLOMA; Surgeon: Carley Pinto MD;  Location: [facility];  Service:     WOUND DEBRIDEMENT N/A 2013    Excisional debridement of Fougnie gangrene, left perianal-Dr. Carley Pinto     Family History   Problem Relation Age of Onset    Prostate cancer Father     Alcohol abuse Father     Cancer Father     Breast cancer Mother     Cancer Mother         Breast    Cancer Maternal Grandfather     Cancer Paternal Grandfather     Cancer Paternal Grandmother     Malig Hyperthermia Neg Hx      Social History     Socioeconomic History    Marital status:      Spouse name: Krista   Tobacco Use    Smoking status: Former     Current packs/day: 0.00     Average packs/day: 2.0 packs/day for 10.0 years (20.0 ttl pk-yrs)     Types: Cigarettes     Start date: 2009     Quit date: 2019     Years since quittin.4    Smokeless tobacco: Never   Vaping Use    Vaping status: Never Used   Substance and Sexual Activity    Alcohol use: Yes     Alcohol/week: 1.0 standard drink of alcohol     Types: 1 Standard drinks or equivalent per week     Comment: monthly    Drug use: No    Sexual activity: Yes     Partners: Female         Current Outpatient Medications:     Hydrocort-Pramoxine, Perianal, (PROCTOFOAM-HS) 1-1 % rectal foam, Insert 1 Application into the rectum 3 (Three) Times a Day., Disp: , Rfl:     losartan (COZAAR) 25 MG tablet, Take 1 tablet  "by mouth Daily., Disp: , Rfl:     pantoprazole (PROTONIX) 40 MG EC tablet, Take 1 tablet by mouth Daily., Disp: , Rfl:     No Known Allergies    PHYSICAL EXAM:    /86 (BP Location: Left arm, Patient Position: Sitting, Cuff Size: Adult)   Ht 182.9 cm (72\")   Wt 106 kg (233 lb 6.4 oz)   BMI 31.65 kg/m²   Body mass index is 31.65 kg/m².  BMI is >= 30 and <35. (Class 1 Obesity). The following options were offered after discussion;: weight loss educational material (shared in after visit summary), exercise counseling/recommendations, and already eating better and used the shots for awhile.      Constitutional: well developed, well nourished, appears  healthy, stated age  ENMT: Hearing intact, neck without masses  CVS: RRR, no murmur  Respiratory: CTA, normal respiratory effort   Gastrointestinal: abdomen soft, anus extremely tender posteriorly with painful exam, large sentinel tag posteriorly  Musculoskeletal: gait normal, muscle mass normal  Neurological: awake and alert, seems to have reasonable capacity for understanding for medical decision making  Psychiatric: appears to have reasonable judgement, pleasant    Reviewed: risk of incontinence with surgery    IMPRESSION:  Anal fissure  History of Melissagentry Dwain with fragile perianal skin.  HTN    PLAN:  Fissurectomy and botox tomorrow with dr bloom (discussed with him)    Carley Pinto MD  13:21 EDT      In order to provide a more personal and interactive patient experience as well as improve efficiency, this note was started prior to the office visit, including review of past history and pertinent images, surgeries.      "

## 2024-06-24 ENCOUNTER — OFFICE VISIT (OUTPATIENT)
Dept: SURGERY | Facility: CLINIC | Age: 39
End: 2024-06-24
Payer: COMMERCIAL

## 2024-06-24 ENCOUNTER — PREP FOR SURGERY (OUTPATIENT)
Dept: OTHER | Facility: HOSPITAL | Age: 39
End: 2024-06-24
Payer: COMMERCIAL

## 2024-06-24 ENCOUNTER — PREP FOR SURGERY (OUTPATIENT)
Age: 39
End: 2024-06-24
Payer: COMMERCIAL

## 2024-06-24 VITALS
DIASTOLIC BLOOD PRESSURE: 86 MMHG | BODY MASS INDEX: 31.61 KG/M2 | HEIGHT: 72 IN | SYSTOLIC BLOOD PRESSURE: 134 MMHG | WEIGHT: 233.4 LBS

## 2024-06-24 DIAGNOSIS — K60.2 FISSURE IN ANO: ICD-10-CM

## 2024-06-24 DIAGNOSIS — K60.2 ANAL FISSURE: Primary | ICD-10-CM

## 2024-06-24 DIAGNOSIS — K62.5 RECTAL BLEEDING: Primary | ICD-10-CM

## 2024-06-24 PROCEDURE — 99214 OFFICE O/P EST MOD 30 MIN: CPT | Performed by: SURGERY

## 2024-06-24 NOTE — DISCHARGE INSTRUCTIONS
Dr. Kenyon Lu  [address] [facility] [address]  [place], [place] [address] [phone]    Discharge Instructions for Anorectal Procedure    There is a prescription waiting for you at [facility] for topical Diltiazem & Lidocaine ointment. They are located at [address], [place] [address], and their phone number is [phone]. Apply this ointment three times a day until the pain has resolved.     Go home, rest and take it easy today; however, you should get up and move about several times today to reduce the risk of developing a clot in your legs.      You may experience some dizziness or memory loss from the anesthesia.  This may last for the next 24 hours.  Someone should plan on staying with you for the first 24 hours for your safety.    Do not make any important legal decisions or sign any legal papers for the next 24 hours.      Eat and drink lightly today.  Start off with liquids, jello, soup, crackers or other bland foods at first. Please drink plenty of fluids. You may advance your diet tomorrow as tolerated as long as you do not experience any nausea or vomiting.     You should apply ice packs to the anal area today and begin your sitz baths tomorrow.    The best method of pain relief is a sitz bath (sitting in a tub or warm water) at least 3 times daily for 15 minutes.  This helps to reduce pain and aids with hygiene/drainage.  The drainage may have an unpleasant odor.  This is not unexpected and should be controlled with baths and showers.      If you have packing, remove the packing tomorrow.  You do not have to replace the packing once removed.  It will be critical to keep the area clean so take a sitz bath or a warm shower and allow soapy water to run over the perianal area after each bowel movement.    Bleeding and drainage are to be expected and may persist for as long as 2-4 weeks. Bleeding may occur with your bowel movements as well. Wear a cotton liner such as a  Kotex pad or panty liner inside your underwear to protect your clothing.      Pain Medications  Alternate taking tylenol and ibuprofen at the doses below. If you have been instructed to not take either of those medicines due to another medical issue, please do not take them.  Tylenol 650 mg every 6 hours as needed for pain  Ibuprofen 600 mg every 6 hours as needed for pain  You have prescription strength pain medications available for you if you experience severe pain. Do not take the oxycodone and diazepam (Valium) simultaneously. Space them out by at least one hour.   Oxycodone 5 mg tablets. Take 1 to 2 tablets every 4 to 6 hours as needed for severe pain.   Diazepam (Valium) 5 mg tablets. Take 1 tablet every 8 hours as needed for severe anal pain or anal spasms. Do NOT take simultaneously with oxycodone.   You will not be totally pain free, but your pain medicine should make the pain tolerable.  Please take your pain medicine as prescribed and always take your pills with food to prevent nausea.  If you are having severe pain that cannot be controlled by the pain medicine, please contact me.    Remember that warm sitz baths are often very effective in controlling pain as well.    The goal is for your bowel movements to be soft which will help to minimize pain.  The pain medicine used to keep your comfortable may also cause some constipation so I recommend the following:    Metamucil powder 1 tablespoon in 8oz of water twice daily  Miralax daily as needed to produce one daily bowel movement   Contact me if the above does not result in soft bowel movements as you may need to add to the regimen.      No driving for 24 hours and for as long as you are taking your prescription pain medicine.  You may resume your activities gradually after 2 weeks. No heavy lifting (> 10 pounds) for 2 weeks.     You may return to work on the second day after surgery as you are able to tolerate.       The office will schedule you a  follow up appointment. If you do not have one or do not hear from the office after one week, please call to schedule.     Remember to contact me for any of the following:    Fever > 101 degrees  Severe pain that cannot be controlled by taking your pain pills  Severe nausea or vomiting that cannot be controlled by taking your nausea pills  Significant bleeding   Inability to urinate  Any other questions or concerns

## 2024-06-24 NOTE — PAT
PAT call complete. Education provided to the patient on the following:    - Nothing to eat after midnight the night before your procedure, water and black coffee okay up to 2 hours before arrival time. 0700  - You will need to have someone drive you home after your procedure and remain with you for 24 hours after. The  will need to remain on site during your visit.  - Please remove all jewelry, including body piercing's, and leave any valuables at home. Only bring your drivers license and insurance card on day of procedure.  - Wash with antibacterial soap (such as Dial) the night before and morning of procedure.  - Be prepared to provide your last dose of all home medications.  - Coffee and vending available on the 1st and 5th floors; no cafeteria on site.  - You will need to arrive at 0900 on 6/25 at [facility] located at [address]. We are located on the 5th floor.  -Please be aware that arrival times may be subject to change up until the day of surgery.   - Feel free to contact us at: [phone] with any additional questions/concerns.

## 2024-06-25 ENCOUNTER — ANESTHESIA (OUTPATIENT)
Age: 39
End: 2024-06-25
Payer: COMMERCIAL

## 2024-06-25 ENCOUNTER — ANESTHESIA EVENT (OUTPATIENT)
Age: 39
End: 2024-06-25
Payer: COMMERCIAL

## 2024-06-25 ENCOUNTER — HOSPITAL ENCOUNTER (OUTPATIENT)
Age: 39
Setting detail: HOSPITAL OUTPATIENT SURGERY
Discharge: HOME | End: 2024-06-25
Attending: SURGERY | Admitting: SURGERY
Payer: COMMERCIAL

## 2024-06-25 VITALS
TEMPERATURE: 98.7 F | HEIGHT: 72 IN | SYSTOLIC BLOOD PRESSURE: 125 MMHG | BODY MASS INDEX: 31.4 KG/M2 | HEART RATE: 72 BPM | WEIGHT: 231.8 LBS | RESPIRATION RATE: 16 BRPM | OXYGEN SATURATION: 100 % | DIASTOLIC BLOOD PRESSURE: 80 MMHG

## 2024-06-25 DIAGNOSIS — K60.2 ANAL FISSURE: Primary | ICD-10-CM

## 2024-06-25 PROCEDURE — 25010000002 PROPOFOL 10 MG/ML EMULSION

## 2024-06-25 PROCEDURE — 46200 REMOVAL OF ANAL FISSURE: CPT | Performed by: SURGERY

## 2024-06-25 PROCEDURE — C9290 INJ, BUPIVACAINE LIPOSOME: HCPCS | Performed by: SURGERY

## 2024-06-25 PROCEDURE — 25010000002 GLYCOPYRROLATE 1 MG/5ML SOLUTION

## 2024-06-25 PROCEDURE — 88304 TISSUE EXAM BY PATHOLOGIST: CPT | Performed by: SURGERY

## 2024-06-25 PROCEDURE — 25810000003 LACTATED RINGERS PER 1000 ML: Performed by: STUDENT IN AN ORGANIZED HEALTH CARE EDUCATION/TRAINING PROGRAM

## 2024-06-25 PROCEDURE — 25010000002 MIDAZOLAM PER 1 MG: Performed by: STUDENT IN AN ORGANIZED HEALTH CARE EDUCATION/TRAINING PROGRAM

## 2024-06-25 PROCEDURE — 0 BUPIVACAINE LIPOSOME 1.3 % SUSPENSION 20 ML VIAL: Performed by: SURGERY

## 2024-06-25 PROCEDURE — 25010000002 ONABOTULINUMTOXINA 100 UNITS RECONSTITUTED SOLUTION: Performed by: SURGERY

## 2024-06-25 PROCEDURE — S0260 H&P FOR SURGERY: HCPCS | Performed by: SURGERY

## 2024-06-25 PROCEDURE — 46505 CHEMODENERVATION ANAL MUSC: CPT | Performed by: SURGERY

## 2024-06-25 DEVICE — HEMOST ABS SURGIFOAM SZ100 8X12 10MM: Type: IMPLANTABLE DEVICE | Site: RECTUM | Status: FUNCTIONAL

## 2024-06-25 RX ORDER — GLYCOPYRROLATE 0.2 MG/ML
INJECTION INTRAMUSCULAR; INTRAVENOUS AS NEEDED
Status: DISCONTINUED | OUTPATIENT
Start: 2024-06-25 | End: 2024-06-25 | Stop reason: SURG

## 2024-06-25 RX ORDER — SODIUM CHLORIDE 0.9 % (FLUSH) 0.9 %
3 SYRINGE (ML) INJECTION EVERY 12 HOURS SCHEDULED
Status: DISCONTINUED | OUTPATIENT
Start: 2024-06-25 | End: 2024-06-25 | Stop reason: HOSPADM

## 2024-06-25 RX ORDER — MAGNESIUM HYDROXIDE 1200 MG/15ML
LIQUID ORAL AS NEEDED
Status: DISCONTINUED | OUTPATIENT
Start: 2024-06-25 | End: 2024-06-25 | Stop reason: HOSPADM

## 2024-06-25 RX ORDER — LABETALOL HYDROCHLORIDE 5 MG/ML
5 INJECTION, SOLUTION INTRAVENOUS
Status: DISCONTINUED | OUTPATIENT
Start: 2024-06-25 | End: 2024-06-25 | Stop reason: HOSPADM

## 2024-06-25 RX ORDER — MIDAZOLAM HYDROCHLORIDE 1 MG/ML
1 INJECTION INTRAMUSCULAR; INTRAVENOUS
Status: DISCONTINUED | OUTPATIENT
Start: 2024-06-25 | End: 2024-06-25 | Stop reason: HOSPADM

## 2024-06-25 RX ORDER — DIPHENHYDRAMINE HYDROCHLORIDE 50 MG/ML
12.5 INJECTION INTRAMUSCULAR; INTRAVENOUS
Status: DISCONTINUED | OUTPATIENT
Start: 2024-06-25 | End: 2024-06-25 | Stop reason: HOSPADM

## 2024-06-25 RX ORDER — HYDROMORPHONE HYDROCHLORIDE 1 MG/ML
0.5 INJECTION, SOLUTION INTRAMUSCULAR; INTRAVENOUS; SUBCUTANEOUS
Status: DISCONTINUED | OUTPATIENT
Start: 2024-06-25 | End: 2024-06-25 | Stop reason: HOSPADM

## 2024-06-25 RX ORDER — LIDOCAINE HYDROCHLORIDE 20 MG/ML
INJECTION, SOLUTION INFILTRATION; PERINEURAL AS NEEDED
Status: DISCONTINUED | OUTPATIENT
Start: 2024-06-25 | End: 2024-06-25 | Stop reason: SURG

## 2024-06-25 RX ORDER — OXYCODONE HYDROCHLORIDE 5 MG/1
5 TABLET ORAL EVERY 4 HOURS PRN
Qty: 30 TABLET | Refills: 0 | Status: SHIPPED | OUTPATIENT
Start: 2024-06-25 | End: 2024-07-09

## 2024-06-25 RX ORDER — DIAZEPAM 5 MG/1
5 TABLET ORAL EVERY 8 HOURS PRN
Qty: 15 TABLET | Refills: 0 | Status: SHIPPED | OUTPATIENT
Start: 2024-06-25 | End: 2024-07-09

## 2024-06-25 RX ORDER — DROPERIDOL 2.5 MG/ML
0.62 INJECTION, SOLUTION INTRAMUSCULAR; INTRAVENOUS
Status: DISCONTINUED | OUTPATIENT
Start: 2024-06-25 | End: 2024-06-25 | Stop reason: HOSPADM

## 2024-06-25 RX ORDER — PROMETHAZINE HYDROCHLORIDE 12.5 MG/1
25 TABLET ORAL ONCE AS NEEDED
Status: DISCONTINUED | OUTPATIENT
Start: 2024-06-25 | End: 2024-06-25 | Stop reason: HOSPADM

## 2024-06-25 RX ORDER — SODIUM CHLORIDE 0.9 % (FLUSH) 0.9 %
3-10 SYRINGE (ML) INJECTION AS NEEDED
Status: DISCONTINUED | OUTPATIENT
Start: 2024-06-25 | End: 2024-06-25 | Stop reason: HOSPADM

## 2024-06-25 RX ORDER — ONDANSETRON 2 MG/ML
4 INJECTION INTRAMUSCULAR; INTRAVENOUS ONCE AS NEEDED
Status: DISCONTINUED | OUTPATIENT
Start: 2024-06-25 | End: 2024-06-25 | Stop reason: HOSPADM

## 2024-06-25 RX ORDER — PROMETHAZINE HYDROCHLORIDE 25 MG/1
25 SUPPOSITORY RECTAL ONCE AS NEEDED
Status: DISCONTINUED | OUTPATIENT
Start: 2024-06-25 | End: 2024-06-25 | Stop reason: HOSPADM

## 2024-06-25 RX ORDER — SODIUM CHLORIDE, SODIUM LACTATE, POTASSIUM CHLORIDE, CALCIUM CHLORIDE 600; 310; 30; 20 MG/100ML; MG/100ML; MG/100ML; MG/100ML
9 INJECTION, SOLUTION INTRAVENOUS CONTINUOUS
Status: DISCONTINUED | OUTPATIENT
Start: 2024-06-25 | End: 2024-06-25 | Stop reason: HOSPADM

## 2024-06-25 RX ORDER — HYDROCODONE BITARTRATE AND ACETAMINOPHEN 7.5; 325 MG/1; MG/1
1 TABLET ORAL ONCE AS NEEDED
Status: DISCONTINUED | OUTPATIENT
Start: 2024-06-25 | End: 2024-06-25 | Stop reason: HOSPADM

## 2024-06-25 RX ORDER — HYDRALAZINE HYDROCHLORIDE 20 MG/ML
5 INJECTION INTRAMUSCULAR; INTRAVENOUS
Status: DISCONTINUED | OUTPATIENT
Start: 2024-06-25 | End: 2024-06-25 | Stop reason: HOSPADM

## 2024-06-25 RX ORDER — FLUMAZENIL 0.1 MG/ML
0.2 INJECTION INTRAVENOUS AS NEEDED
Status: DISCONTINUED | OUTPATIENT
Start: 2024-06-25 | End: 2024-06-25 | Stop reason: HOSPADM

## 2024-06-25 RX ORDER — OXYCODONE AND ACETAMINOPHEN 7.5; 325 MG/1; MG/1
1 TABLET ORAL EVERY 4 HOURS PRN
Status: DISCONTINUED | OUTPATIENT
Start: 2024-06-25 | End: 2024-06-25 | Stop reason: HOSPADM

## 2024-06-25 RX ORDER — PROPOFOL 10 MG/ML
VIAL (ML) INTRAVENOUS AS NEEDED
Status: DISCONTINUED | OUTPATIENT
Start: 2024-06-25 | End: 2024-06-25 | Stop reason: SURG

## 2024-06-25 RX ORDER — ACETAMINOPHEN 500 MG
1000 TABLET ORAL ONCE
Status: COMPLETED | OUTPATIENT
Start: 2024-06-25 | End: 2024-06-25

## 2024-06-25 RX ORDER — NALOXONE HCL 0.4 MG/ML
0.2 VIAL (ML) INJECTION AS NEEDED
Status: DISCONTINUED | OUTPATIENT
Start: 2024-06-25 | End: 2024-06-25 | Stop reason: HOSPADM

## 2024-06-25 RX ORDER — FENTANYL CITRATE 50 UG/ML
50 INJECTION, SOLUTION INTRAMUSCULAR; INTRAVENOUS
Status: DISCONTINUED | OUTPATIENT
Start: 2024-06-25 | End: 2024-06-25 | Stop reason: HOSPADM

## 2024-06-25 RX ADMIN — SODIUM CHLORIDE, POTASSIUM CHLORIDE, SODIUM LACTATE AND CALCIUM CHLORIDE 9 ML/HR: 600; 310; 30; 20 INJECTION, SOLUTION INTRAVENOUS at 09:35

## 2024-06-25 RX ADMIN — LIDOCAINE HYDROCHLORIDE 80 MG: 20 INJECTION, SOLUTION INFILTRATION; PERINEURAL at 10:01

## 2024-06-25 RX ADMIN — GLYCOPYRROLATE 0.1 MG: 1 INJECTION INTRAMUSCULAR; INTRAVENOUS at 10:01

## 2024-06-25 RX ADMIN — PROPOFOL 125 MCG/KG/MIN: 10 INJECTION, EMULSION INTRAVENOUS at 10:06

## 2024-06-25 RX ADMIN — ACETAMINOPHEN 1000 MG: 500 TABLET ORAL at 09:35

## 2024-06-25 RX ADMIN — MIDAZOLAM 1 MG: 1 INJECTION INTRAMUSCULAR; INTRAVENOUS at 09:35

## 2024-06-25 RX ADMIN — PROPOFOL 100 MG: 10 INJECTION, EMULSION INTRAVENOUS at 10:01

## 2024-06-25 RX ADMIN — PROPOFOL 50 MG: 10 INJECTION, EMULSION INTRAVENOUS at 10:07

## 2024-06-25 NOTE — H&P
Colorectal & General Surgery  History and Physical    Patient: Sebastien Dupree  YOB: 1985  MRN: [id number]      Assessment  Sebastien Dupree is a 38 y.o. male with anal fissure in the posterior position with associated sentinel tag.  We discussed proceeding the operating room for anorectal examination under esthesia, fissurectomy, and chemical denervation of the internal anal sphincter muscle with botulinum toxin.  We discussed the risk of fecal incontinence, recurrence, and continued postoperative pain.  Informed symptoms obtained.    Plan  Proceed with anorectal examination under anesthesia, fissurectomy, and chemical denervation of the internal anal sphincter muscle with botulinum toxin      History of Present Illness   Sebastien Dupree is a 38 y.o. male with long history of perineal problems who presents with an anal fissure for about a month.  It is associated with an anal skin tag as well.    Past Medical History   Past Medical History:   Diagnosis Date    Acid reflux     Anxiety     Back problem     Colon polyp 05/04/2016    tubular adenoma in transverse colon    Genital warts due to HPV (human papillomavirus)     Lili    H/O Demetri's disease     Hypertension     Rectal bleeding         Past Surgical History   Past Surgical History:   Procedure Laterality Date    COLONOSCOPY N/A 5/4/2016    Procedure: COLONOSCOPY INTO CECUM/TERMINAL ILEUM WITH POLYPECTOMY;  Surgeon: Carley Pinto MD;  Location: [facility] ENDOSCOPY;  Service:     COLONOSCOPY N/A 5/14/2018    Procedure: COLONOSCOPY TO CECUM AND TI with possible internal hemorrhoidal biopsy;  Surgeon: Carley Pinto MD;  Location: [facility] ENDOSCOPY;  Service: Gastroenterology    COLONOSCOPY N/A 8/23/2021    Procedure: COLONOSCOPY TO cecum and terminal ileum;  Surgeon: Carley Pinto MD;  Location: [facility] ENDOSCOPY;  Service: General;  Laterality: N/A;  RECTAL BLEEDING  -- anal papilla    ENDOSCOPY N/A 8/23/2021     Procedure: ESOPHAGOGASTRODUODENOSCOPY with bx;  Surgeon: Carlye Pinto MD;  Location: [facility] ENDOSCOPY;  Service: General;  Laterality: N/A;  GERD, ABD PAIN  -- possible patricia's    EXCISION LESION N/A 2013    1.6 cm right groin lesion, 1.2 cm right groin lesion, 8 mm left perianal exophytic skin lesion, 12:30 to 2:30 left perianal 8 x 3 cm exophytic skin lesion, right thigh abscess. Dr. Carley Pinto    LAPAROSCOPIC CHOLECYSTECTOMY N/A 2010    Dr. Fabián Carty    PENILE LESION EXCISION/BIOPSY N/A 2014    Excision of 3 penile lesions, 6, 5, and 4 mm, excision of 2 infrascrotal lesions, right 4 mm eft 6 mm, excision of 2.2 cm pubic lesion, excision of 8 mm abdominal lesions, excision of right anterior perianal mass 9 x 4 cm, closure with advancement flaps-Dr. Carley Pinto    PERIANAL LESION/CYST EXCISION N/A 2014    multiple penile/perineal/perianal lesions excised, Dr. Carley Pinto    RECTAL MASS EXCISION N/A 3/17/2016    Procedure:  EXCISION OF PERIANAL MASSES, RIGHT GROIN; PATH - CONDYLOMA; Surgeon: Carlye Pinto MD;  Location: [facility];  Service:     WOUND DEBRIDEMENT N/A 2013    Excisional debridement of Fougnie gangrene, left perianal-Dr. Carley Pinto       Social History  Social History     Socioeconomic History    Marital status:      Spouse name: Krista   Tobacco Use    Smoking status: Former     Current packs/day: 0.00     Average packs/day: 2.0 packs/day for 10.0 years (20.0 ttl pk-yrs)     Types: Cigarettes     Start date: 2009     Quit date: 2019     Years since quittin.4    Smokeless tobacco: Never   Vaping Use    Vaping status: Never Used   Substance and Sexual Activity    Alcohol use: Yes     Alcohol/week: 1.0 standard drink of alcohol     Types: 1 Standard drinks or equivalent per week     Comment: monthly    Drug use: No    Sexual activity: Yes     Partners: Female       Family History  Family History   Problem Relation Age of Onset     Prostate cancer Father     Alcohol abuse Father     Cancer Father     Breast cancer Mother     Cancer Mother         Breast    Cancer Maternal Grandfather     Cancer Paternal Grandfather     Cancer Paternal Grandmother     Malig Hyperthermia Neg Hx        Review of Systems  Negative except as documented in the HPI.     Allergies  No Known Allergies    Medications    Current Facility-Administered Medications:     lactated ringers infusion, 9 mL/hr, Intravenous, Continuous, Shashank Crouch MD, Last Rate: 9 mL/hr at 06/25/24 0935, 9 mL/hr at 06/25/24 0935    midazolam (VERSED) injection 1 mg, 1 mg, Intravenous, Q5 Min PRN, Shashank Crouch MD, 1 mg at 06/25/24 0935    sodium chloride 0.9 % flush 3 mL, 3 mL, Intravenous, Q12H, Shashank Crouch MD    sodium chloride 0.9 % flush 3-10 mL, 3-10 mL, Intravenous, PRN, Shashank Crouch MD    Vital Signs  Vitals:    06/25/24 0911   BP: 136/95   Pulse: 58   Resp: 16   Temp: 98.2 °F (36.8 °C)   SpO2: 100%        Physical Exam  Constitutional: Resting comfortably, no acute distress  Neck: Supple, trachea midline  Respiratory: No increased work of breathing, Symmetric excursion  Cardiovascular: Well pefursed, no jugular venous distention evident   Abdominal:  Soft, non-tender, non-distended  Lymphatics: No cervical or suprascapular adenopathy  Skin: Warm, dry, no rash on visualized skin surfaces  Musculoskeletal: Symmetric strength, no obvious gross abnormalities  Psychiatric: Alert and oriented ×3, normal affect            Kenyon Lu MD  Colorectal & General Surgery  [facility]    [address] [place] [address], [address]  [place], [place], [address]  P: [phone]  F: [phone]

## 2024-06-25 NOTE — ANESTHESIA POSTPROCEDURE EVALUATION
Patient: Sebastien Dupree    Procedure Summary       Date: 06/25/24 Room / Location: [facility] OR  / [facility] MAIN OR    Anesthesia Start: 0955 Anesthesia Stop: 1036    Procedure: Anorectal examination under anesthesia, fissurectomy, chemical denervation of the internal anal sphincter muscle with botulinum toxin Diagnosis:       Anal fissure      (Anal fissure [K60.2])    Surgeons: Felipe Lu MD Provider: Shashank Crouch MD    Anesthesia Type: general ASA Status: 2            Anesthesia Type: general    Vitals  Vitals Value Taken Time   /80 06/25/24 1037   Temp 37.1 °C (98.7 °F) 06/25/24 1032   Pulse 72 06/25/24 1037   Resp 16 06/25/24 1037   SpO2 100 % 06/25/24 1037           Post Anesthesia Care and Evaluation    Patient location during evaluation: bedside  Patient participation: complete - patient participated  Level of consciousness: awake and alert  Pain management: adequate    Airway patency: patent  Anesthetic complications: No anesthetic complications  PONV Status: controlled  Cardiovascular status: blood pressure returned to baseline and acceptable  Respiratory status: acceptable  Hydration status: acceptable

## 2024-06-25 NOTE — OP NOTE
Colorectal & General Surgery  Operative Report    Patient: Sebastien Dupree  YOB: 1985  MRN: [id number]  DATE OF PROCEDURE: 06/25/24     PREOPERATIVE DIAGNOSIS:  Anal fissure with sentinel tag in the posterior position    POSTOPERATIVE DIAGNOSIS:  Same    PROCEDURE:  Anorectal examination under anesthesia  Anal fissurectomy with excision of associated anal skin tag  Chemical denervation of the internal anal sphincter with botulinum toxin    FINDINGS:  Posterior anal fissure, 8 mm in length.  Skin tag associated with it in the posterior position.    SURGEON:  Kenyon Lu MD    ASSISTANT:  None    ANESTHESIA:  Monitored anesthesia care    EBL:  None    SPECIMEN:  Anal fissure    OPERATIVE DESCRIPTION:  The patient was brought to the operating room under the care of the nursing staff.  The patient was placed on the operating room table in the prone position where anesthesia was induced.  The patient was then prepped and positioned in the usual sterile fashion.  A standardized timeout was then performed.    External examination demonstrated a sentinel tag in the posterior position.  Digital rectal exam was normal.  Circumferential anal block was performed with half percent Marcaine and Exparel.  Eugene retractor was inserted into the anal canal and circumferential examination of the anal mucosa was performed.  There was an obvious fissure in the posterior midline, 8 mm in length with associated sentinel tag.  The sentinel tag and the edges of the anal fissure were excised.  The anal mucosa and anoderm that have been divided were reapproximated with a running 3-0 chromic suture.  Hemostasis observed.  50 units of botulinum toxin were injected into the posterior portion of the internal anal sphincter muscle.  Gelfoam was placed to aid in hemostasis.  Mesh pants with fluffs were placed for dressing.    All needle, sponge, and instrument counts were correct at the end of the case.    The patient  tolerated the procedure well and was transferred to the postanesthesia care unit in stable condition.    Kenyon Lu M.D.  Colorectal & General Surgery  [facility]    [address], [address]  [place], [place], [address]  P: [phone]  F: [phone]

## 2024-06-25 NOTE — ANESTHESIA PREPROCEDURE EVALUATION
Anesthesia Evaluation     Patient summary reviewed and Nursing notes reviewed   no history of anesthetic complications:   NPO Solid Status: > 8 hours  NPO Liquid Status: > 2 hours           Airway   Mallampati: II  TM distance: >3 FB  Neck ROM: full  Dental      Pulmonary    Cardiovascular     ECG reviewed    (+) hypertension      Neuro/Psych  GI/Hepatic/Renal/Endo    (+) obesity, GERD    Musculoskeletal     Abdominal    Substance History      OB/GYN          Other                          Anesthesia Plan    ASA 2     general     intravenous induction     Anesthetic plan, risks, benefits, and alternatives have been provided, discussed and informed consent has been obtained with: patient.        CODE STATUS:

## 2024-06-26 ENCOUNTER — TELEPHONE (OUTPATIENT)
Dept: SURGERY | Facility: CLINIC | Age: 39
End: 2024-06-26
Payer: COMMERCIAL

## 2024-06-26 LAB
LAB AP CASE REPORT: NORMAL
PATH REPORT.FINAL DX SPEC: NORMAL
PATH REPORT.GROSS SPEC: NORMAL

## 2024-06-26 NOTE — TELEPHONE ENCOUNTER
Spoke with patient's with, she was corned about when to remove the dressing from the area, if it was something that would come out when patient had a bowel movement or did it need to be remove. This morning they were able to remove some but it was falling apart, afterwards patient presented some bleeding. Advised what they saw was Gelfoam and would dissolve by itself no need to remove it, it is normal to have some bleeding and pain, because this is a sensible area. Advised to continue taking the pain medication, miralax and patient may add Metamucil twice daily if needed, intake lots of water, and walk around as much as possible as well as icing the area. After a BM patient needs to keep area clean, he may get in the shower and clean with water. Wife stated they are going on vacation on July 13th and the drive would be 10 hours long.     S/P Anorectal examination, anal fissurectomy and chemical denervation of internal anal sphincter 06/25/24

## (undated) DEVICE — LOU MINOR PROCEDURE: Brand: MEDLINE INDUSTRIES, INC.

## (undated) DEVICE — FRCP BX RADJAW4 NDL 2.8 240CM LG OG BX40

## (undated) DEVICE — ADAPT CLN BIOGUARD AIR/H2O DISP

## (undated) DEVICE — LN SMPL CO2 SHTRM SD STREAM W/M LUER

## (undated) DEVICE — THE STERILE LIGHT HANDLE COVER IS USED WITH STERIS SURGICAL LIGHTING AND VISUALIZATION SYSTEMS.

## (undated) DEVICE — POSTN CONVOL FM OR TABLE 2X12X20

## (undated) DEVICE — SENSR O2 OXIMAX FNGR A/ 18IN NONSTR

## (undated) DEVICE — TUBING, SUCTION, 1/4" X 10', STRAIGHT: Brand: MEDLINE

## (undated) DEVICE — Device: Brand: DEFENDO AIR/WATER/SUCTION AND BIOPSY VALVE

## (undated) DEVICE — SUT GUT CHRM 3/0 SH 27IN G122H

## (undated) DEVICE — GAUZE,SPONGE,4"X4",16PLY,STRL,LF,10/TRAY: Brand: MEDLINE

## (undated) DEVICE — JELLY,LUBE,STERILE,FLIP TOP,TUBE,4-OZ: Brand: MEDLINE

## (undated) DEVICE — SPNG LAP 18X18IN LF STRL PK/5

## (undated) DEVICE — PREP SOL POVIDONE/IODINE BT 4OZ

## (undated) DEVICE — BITEBLOCK OMNI BLOC

## (undated) DEVICE — THE TORRENT IRRIGATION SCOPE CONNECTOR IS USED WITH THE TORRENT IRRIGATION TUBING TO PROVIDE IRRIGATION FLUIDS SUCH AS STERILE WATER DURING GASTROINTESTINAL ENDOSCOPIC PROCEDURES WHEN USED IN CONJUNCTION WITH AN IRRIGATION PUMP (OR ELECTROSURGICAL UNIT).: Brand: TORRENT

## (undated) DEVICE — PAD,ABDOMINAL,8"X10",ST,LF: Brand: MEDLINE

## (undated) DEVICE — PATIENT RETURN ELECTRODE, SINGLE-USE, CONTACT QUALITY MONITORING, ADULT, WITH 9FT CORD, FOR PATIENTS WEIGING OVER 33LBS. (15KG): Brand: MEGADYNE

## (undated) DEVICE — MSK PROC CURAPLEX O2 2/ADAPT 7FT

## (undated) DEVICE — KT ORCA ORCAPOD DISP STRL

## (undated) DEVICE — PANTY KNIT MATERN L/XL

## (undated) DEVICE — GLV SURG PREMIERPRO ORTHO LTX PF SZ7 BRN

## (undated) DEVICE — SYR LL TP 10ML STRL

## (undated) DEVICE — INTENDED FOR TISSUE SEPARATION, AND OTHER PROCEDURES THAT REQUIRE A SHARP SURGICAL BLADE TO PUNCTURE OR CUT.: Brand: BARD-PARKER ® DISPOSABLE SCALPELS

## (undated) DEVICE — SINGLE-USE BIOPSY FORCEPS: Brand: RADIAL JAW 4

## (undated) DEVICE — GLV SURG SENSICARE PI PF LF 7 GRN STRL

## (undated) DEVICE — GLV SURG SENSICARE PI MIC PF SZ7 LF STRL

## (undated) DEVICE — 1 ML TUBERCULIN SYRINGE REGULAR TIP: Brand: MONOJECT

## (undated) DEVICE — DRSNG PAD ABD 8X10IN STRL

## (undated) DEVICE — TAPE,CLOTH/SILK,CURAD,3"X10YD,LF,40/CS: Brand: CURAD

## (undated) DEVICE — CANN NASL CO2 TRULINK W/O2 A/

## (undated) DEVICE — NEEDLE,HYPODERM,SAFETY, 22GX1.5: Brand: MEDLINE

## (undated) DEVICE — TRAP FLD MEGADYNE

## (undated) DEVICE — NDL HYPO PRECISIONGLIDE REG 25G 1 1/2